# Patient Record
(demographics unavailable — no encounter records)

---

## 2025-05-23 NOTE — LETTER BODY
[Dear  ___] : Dear  [unfilled], [Courtesy Letter:] : I had the pleasure of seeing your patient, [unfilled], in my office today. [Please see my note below.] : Please see my note below. [Consult Closing:] : Thank you very much for allowing me to participate in the care of this patient.  If you have any questions, please do not hesitate to contact me. [Sincerely,] : Sincerely, [FreeTextEntry3] : Sandra Banuelos MD Director of Robotic Education [facility] at [facility]   [email or web address] [phone]

## 2025-05-23 NOTE — ASSESSMENT
[FreeTextEntry1] : 55 year old here to establish care, family history prostate cancer. No issues with urination or erections. PSA wnl. Grandfather lethal prostate cancer - discussed consideration genetics referral, not interested at this time. Will monitor PSA annually and low threshold for MRI given family history.  Plan: UA culture PSA 1 year at PCP and fu up here after fu 1 year

## 2025-05-23 NOTE — HISTORY OF PRESENT ILLNESS
[FreeTextEntry1] : Name TONA VITAL  MRN [id number]   Dec 25 1969  Contact Number: [phone] ----------------------------------------------------------------------------------------------------------------------------------------- Date of First Visit: 25 Referring Provider/PCP: Dr. Brandi Lu f: [phone]  -----------------------------------------------------------------------------------------------------------------------------------------  CC: establish care  History of Present Illness: TONA VITAL is a 55 year old male who presents to establish care. Patient denies any issues with urination, no significant frequency or urgency. Feels like empties bladder fully. Good stream. Patient reports had UTI last year, took abx, and resolved. No associated fevers or chills, No hematuria or nephrolithiasis. No prior STIs. Denies issues with erections.  Patient reports family history prostate cancer, maternal grandfather  from it late 70s. Mother  from a soft tissue cancer at age 82. Patient gets regular PSA screening. Recent PSA 2025 at PCP 2.4.  Labs 5/15/25: PSA 2.4 Cr 1.08  3/13/243: UA nit neg LE neg blood neg  IPSS 2 QOL 0 AYLA 25 PVR (to ensure adequate emptying): 74   PMH: herniated disc PSH: vasectomy Family History: prostate cancer maternal grandfather  from it late 70s. Mother  from a soft tissue cancer at age 82 Social: single, 1 child,  ABS [employer], never smoker, social alcohol, no recreational drugs Meds: vit D, glucosamine, B-complex, pregabaline, paracetamol clorzoxazona, carbamazepine Allergies: NKDA ROS: no fevers, chills, flank pain